# Patient Record
(demographics unavailable — no encounter records)

---

## 2025-04-11 NOTE — COUNSELING
[Health Goal(s) Reviewed with Patient] : Health Goal(s) Reviewed with Patient [[id number]] : 10 pound weight loss [[id number]] : Difficulty in maintaining dietary modification. Lack of exercise. [[id number]] : Information regarding exercises given to patient.

## 2025-04-11 NOTE — HEALTH RISK ASSESSMENT
[Patient reported colonoscopy was normal] : Patient reported colonoscopy was normal [ColonoscopyDate] : 2018 [ColonoscopyComments] : single polyp, f/u 5 yrs.

## 2025-04-11 NOTE — DATA REVIEWED
[FreeTextEntry1] : Labs reviewed with patient. There have been no interval changes since last visit. All questions regarding labs were answered. EKG showed Sinus Rhythm with RSR(V1), unchanged. Scheduled risk stratification - exercise stress test and echo requested. Will review labs when available. Refills given.

## 2025-04-11 NOTE — HISTORY OF PRESENT ILLNESS
[PMH Reviewed and Updated] : past medical history reviewed and updated [PSH Reviewed and Updated] : past surgical history reviewed and updated [Medication and Allergies Reconciled] : medication and allergies reconciled [Working Full Time] : working full time [Compliant with medications] : compliant with medications [FreeTextEntry1] : Mr. Carmichael has no focal complaints at this time. He denies any recent chest discomfort, shortness of breath, palpitations, diaphoresis, change in bowel movements or weight, bleeding or syncopal episodes. There have been no changes in his medical regimen. He remains active with no other constitutional complaints. Weight looking good. Labs were reviewed, cholesterol profile not done on last labs, reordered.

## 2025-04-11 NOTE — ASSESSMENT
[FreeTextEntry1] : Increase exercise as tolerated. Repeat labs in One year for reevaluation.  LDL>100 consistently, will titrate Crestor. No other cardiac risk factors. h/o snoring, BENJIE? Referred for sleep study. Fit for duty.